# Patient Record
Sex: FEMALE | Race: WHITE | NOT HISPANIC OR LATINO | Employment: FULL TIME | URBAN - NONMETROPOLITAN AREA
[De-identification: names, ages, dates, MRNs, and addresses within clinical notes are randomized per-mention and may not be internally consistent; named-entity substitution may affect disease eponyms.]

---
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
## 2024-09-23 NOTE — PROGRESS NOTES
Assessment & Plan     (F34.1) Dysthymic disorder  (primary encounter diagnosis)  Comment: Zoloft gave her a tremor feeling in side so she quit taking. Will start Lexapro and Buspar. Denies SI/HI thoughts. No plan to harm herself   Plan: busPIRone (BUSPAR) 5 MG tablet, escitalopram         (LEXAPRO) 10 MG tablet            (F41.1) JOE (generalized anxiety disorder)  Comment: Treat with Lexapro and Buspar. Denies SI/HI thoughts. No plan to harm herself   Plan: busPIRone (BUSPAR) 5 MG tablet, escitalopram         (LEXAPRO) 10 MG tablet            (Z00.00) Healthcare maintenance  Comment: Check labs   Plan: TSH with free T4 reflex, Comprehensive         metabolic panel (BMP + Alb, Alk Phos, ALT, AST,        Total. Bili, TP), CBC with platelets, Lipid         Profile (Chol, Trig, HDL, LDL calc)            (R23.8) Scalp irritation  Comment: Treat with Clotrimazole cream   Plan: clotrimazole (LOTRIMIN) 1 % external cream            (B35.1) Nail fungus  Comment: Treat with Lotrisone cream for 4 weeks   Plan: clotrimazole-betamethasone (LOTRISONE) 1-0.05 %        external cream            (Z76.89) Encounter to establish care  Comment: Reviewed past medical, surgical, and family history. N active medications. Care established   Plan: As above     The longitudinal plan of care for the diagnosis(es)/condition(s) as documented were addressed during this visit. Due to the added complexity in care, I will continue to support Maureen in the subsequent management and with ongoing continuity of care.    Depression Screening Follow Up        9/25/2024     1:38 PM   PHQ   PHQ-9 Total Score 19   Q9: Thoughts of better off dead/self-harm past 2 weeks Nearly every day   F/U: Thoughts of suicide or self-harm Yes   F/U: Self harm-plan Yes   F/U: Self-harm action Yes   F/U: Safety concerns No         9/25/2024     1:38 PM   Last PHQ-9   1.  Little interest or pleasure in doing things 3   2.  Feeling down, depressed, or hopeless 2   3.   Trouble falling or staying asleep, or sleeping too much 0   4.  Feeling tired or having little energy 2   5.  Poor appetite or overeating 3   6.  Feeling bad about yourself 3   7.  Trouble concentrating 3   8.  Moving slowly or restless 0   Q9: Thoughts of better off dead/self-harm past 2 weeks 3   PHQ-9 Total Score 19   In the past two weeks have you had thoughts of suicide or self harm? Yes   Do you have concerns about your personal safety or the safety of others? No   In the past 2 weeks have you thought about a plan or had intention to harm yourself? Yes   In the past 2 weeks have you acted on these thoughts in any way? Yes     Denies SI/HI. No plan to harm herself.     Declined counseling. She's done counseling in the past, but didn't feel it was beneficial.     Declined any further resources.     Follow Up Actions Taken  Crisis resource information provided in the After Visit Summary  Patient declined referral.  Started on an antidepressant and antianxiety medication today     Discussed the following ways the patient can remain in a safe environment:  remove things I could use to hurt myself: yes and be around others    See Patient Instructions    Return in about 6 weeks (around 11/6/2024) for Med check .    Yue Reddy is a 19 year old, presenting for the following health issues:  Establish Care, Nail Problem, and Hair/Scalp Problem        9/25/2024     1:43 PM   Additional Questions   Roomed by Komal   Accompanied by self     History of Present Illness       Reason for visit:  Nail fungus  Symptom onset:  More than a month  Symptoms include:  Nail fungus  Symptom intensity:  Moderate  Symptom progression:  Worsening  Had these symptoms before:  No  What makes it worse:  No  What makes it better:  No   She is taking medications regularly.       New Patient/Transfer of Care      Health Care Directive  Patient does not have a Health Care Directive or Living Will: Discussed advance care planning with  "patient; however, patient declined at this time.    She is working at [employer] as a .     She is single. No children.     Derm problem     Duration: 1 year   Description (location/character/radiation): cyst right side of head    Intensity:  moderate  Accompanying signs and symptoms: none   History (similar episodes/previous evaluation): None  Precipitating or alleviating factors: None  Therapies tried and outcome: None     Toenail fungus for at least the past year.     Review of Systems  Constitutional, HEENT, cardiovascular, pulmonary, GI, , musculoskeletal, neuro, skin, endocrine and psych systems are negative, except as otherwise noted.      Objective    BP 96/52   Pulse 63   Temp 98.1  F (36.7  C) (Tympanic)   Ht 1.626 m (5' 4\")   Wt 58.1 kg (128 lb)   SpO2 98%   BMI 21.97 kg/m    Body mass index is 21.97 kg/m .  Physical Exam   GENERAL: alert and no distress  HENT: ear canals and TM's normal, nose and mouth without ulcers or lesions  NECK: no adenopathy, no asymmetry, masses, or scars  RESP: lungs clear to auscultation - no rales, rhonchi or wheezes  CV: regular rate and rhythm, normal S1 S2, no S3 or S4, no murmur, click or rub, no peripheral edema  MS: no gross musculoskeletal defects noted, no edema  SKIN: no suspicious lesions or rashes. +toe nails with toenail fungus. +right side of mid scalp with a scabbed lesion (picking characteristic) without signs of infection   NEURO: Normal strength and tone, mentation intact and speech normal  PSYCH: mentation appears normal, affect normal/bright        Signed Electronically by: GUNNER Pemberton CNP    "

## 2024-09-25 ENCOUNTER — OFFICE VISIT (OUTPATIENT)
Dept: FAMILY MEDICINE | Facility: OTHER | Age: 19
End: 2024-09-25
Attending: NURSE PRACTITIONER
Payer: COMMERCIAL

## 2024-09-25 VITALS
OXYGEN SATURATION: 98 % | TEMPERATURE: 98.1 F | SYSTOLIC BLOOD PRESSURE: 96 MMHG | HEART RATE: 63 BPM | HEIGHT: 64 IN | WEIGHT: 128 LBS | DIASTOLIC BLOOD PRESSURE: 52 MMHG | BODY MASS INDEX: 21.85 KG/M2

## 2024-09-25 DIAGNOSIS — Z76.89 ENCOUNTER TO ESTABLISH CARE: ICD-10-CM

## 2024-09-25 DIAGNOSIS — F41.1 GAD (GENERALIZED ANXIETY DISORDER): ICD-10-CM

## 2024-09-25 DIAGNOSIS — B35.1 NAIL FUNGUS: ICD-10-CM

## 2024-09-25 DIAGNOSIS — F34.1 DYSTHYMIC DISORDER: Primary | ICD-10-CM

## 2024-09-25 DIAGNOSIS — R23.8 SCALP IRRITATION: ICD-10-CM

## 2024-09-25 DIAGNOSIS — Z00.00 HEALTHCARE MAINTENANCE: ICD-10-CM

## 2024-09-25 LAB
ALBUMIN SERPL BCG-MCNC: 4.4 G/DL (ref 3.5–5.2)
ALP SERPL-CCNC: 46 U/L (ref 40–150)
ALT SERPL W P-5'-P-CCNC: 19 U/L (ref 0–50)
ANION GAP SERPL CALCULATED.3IONS-SCNC: 13 MMOL/L (ref 7–15)
AST SERPL W P-5'-P-CCNC: 22 U/L (ref 0–45)
BILIRUB SERPL-MCNC: 0.4 MG/DL
BUN SERPL-MCNC: 12.5 MG/DL (ref 6–20)
CALCIUM SERPL-MCNC: 9.5 MG/DL (ref 8.8–10.4)
CHLORIDE SERPL-SCNC: 103 MMOL/L (ref 98–107)
CHOLEST SERPL-MCNC: 112 MG/DL
CREAT SERPL-MCNC: 0.95 MG/DL (ref 0.51–0.95)
EGFRCR SERPLBLD CKD-EPI 2021: 87 ML/MIN/1.73M2
ERYTHROCYTE [DISTWIDTH] IN BLOOD BY AUTOMATED COUNT: 12.9 % (ref 10–15)
FASTING STATUS PATIENT QL REPORTED: NO
FASTING STATUS PATIENT QL REPORTED: NO
GLUCOSE SERPL-MCNC: 78 MG/DL (ref 70–99)
HCO3 SERPL-SCNC: 23 MMOL/L (ref 22–29)
HCT VFR BLD AUTO: 41.3 % (ref 35–47)
HDLC SERPL-MCNC: 61 MG/DL
HGB BLD-MCNC: 13.7 G/DL (ref 11.7–15.7)
LDLC SERPL CALC-MCNC: 41 MG/DL
MCH RBC QN AUTO: 28.9 PG (ref 26.5–33)
MCHC RBC AUTO-ENTMCNC: 33.2 G/DL (ref 31.5–36.5)
MCV RBC AUTO: 87 FL (ref 78–100)
NONHDLC SERPL-MCNC: 51 MG/DL
PLATELET # BLD AUTO: 195 10E3/UL (ref 150–450)
POTASSIUM SERPL-SCNC: 4 MMOL/L (ref 3.4–5.3)
PROT SERPL-MCNC: 7 G/DL (ref 6.4–8.3)
RBC # BLD AUTO: 4.74 10E6/UL (ref 3.8–5.2)
SODIUM SERPL-SCNC: 139 MMOL/L (ref 135–145)
TRIGL SERPL-MCNC: 52 MG/DL
TSH SERPL DL<=0.005 MIU/L-ACNC: 0.81 UIU/ML (ref 0.3–4.2)
WBC # BLD AUTO: 6.1 10E3/UL (ref 4–11)

## 2024-09-25 PROCEDURE — 36415 COLL VENOUS BLD VENIPUNCTURE: CPT | Performed by: NURSE PRACTITIONER

## 2024-09-25 PROCEDURE — 80061 LIPID PANEL: CPT | Performed by: NURSE PRACTITIONER

## 2024-09-25 PROCEDURE — 80053 COMPREHEN METABOLIC PANEL: CPT | Performed by: NURSE PRACTITIONER

## 2024-09-25 PROCEDURE — 84443 ASSAY THYROID STIM HORMONE: CPT | Performed by: NURSE PRACTITIONER

## 2024-09-25 PROCEDURE — 99215 OFFICE O/P EST HI 40 MIN: CPT | Performed by: NURSE PRACTITIONER

## 2024-09-25 PROCEDURE — 85027 COMPLETE CBC AUTOMATED: CPT | Performed by: NURSE PRACTITIONER

## 2024-09-25 PROCEDURE — G2211 COMPLEX E/M VISIT ADD ON: HCPCS | Performed by: NURSE PRACTITIONER

## 2024-09-25 RX ORDER — ESCITALOPRAM OXALATE 10 MG/1
10 TABLET ORAL DAILY
Qty: 30 TABLET | Refills: 1 | Status: SHIPPED | OUTPATIENT
Start: 2024-09-25 | End: 2024-10-25

## 2024-09-25 RX ORDER — CLOTRIMAZOLE AND BETAMETHASONE DIPROPIONATE 10; .64 MG/G; MG/G
CREAM TOPICAL 2 TIMES DAILY
Qty: 90 G | Refills: 3 | Status: SHIPPED | OUTPATIENT
Start: 2024-09-25

## 2024-09-25 RX ORDER — BUSPIRONE HYDROCHLORIDE 5 MG/1
5 TABLET ORAL 3 TIMES DAILY PRN
Qty: 30 TABLET | Refills: 1 | Status: SHIPPED | OUTPATIENT
Start: 2024-09-25

## 2024-09-25 RX ORDER — CLOTRIMAZOLE 1 %
CREAM (GRAM) TOPICAL 2 TIMES DAILY
Qty: 60 G | Refills: 0 | Status: SHIPPED | OUTPATIENT
Start: 2024-09-25 | End: 2024-09-26

## 2024-09-25 ASSESSMENT — PATIENT HEALTH QUESTIONNAIRE - PHQ9
SUM OF ALL RESPONSES TO PHQ QUESTIONS 1-9: 19
SUM OF ALL RESPONSES TO PHQ QUESTIONS 1-9: 19
10. IF YOU CHECKED OFF ANY PROBLEMS, HOW DIFFICULT HAVE THESE PROBLEMS MADE IT FOR YOU TO DO YOUR WORK, TAKE CARE OF THINGS AT HOME, OR GET ALONG WITH OTHER PEOPLE: SOMEWHAT DIFFICULT

## 2024-09-25 ASSESSMENT — PAIN SCALES - GENERAL: PAINLEVEL: NO PAIN (0)

## 2024-09-26 DIAGNOSIS — R23.8 SCALP IRRITATION: ICD-10-CM

## 2024-09-26 RX ORDER — CLOTRIMAZOLE 1 %
CREAM (GRAM) TOPICAL 2 TIMES DAILY
Qty: 60 G | Refills: 0 | Status: SHIPPED | OUTPATIENT
Start: 2024-09-26

## 2024-11-06 ENCOUNTER — OFFICE VISIT (OUTPATIENT)
Dept: FAMILY MEDICINE | Facility: OTHER | Age: 19
End: 2024-11-06
Attending: NURSE PRACTITIONER
Payer: COMMERCIAL

## 2024-11-06 VITALS
HEART RATE: 77 BPM | TEMPERATURE: 98.3 F | WEIGHT: 136.3 LBS | HEIGHT: 64 IN | DIASTOLIC BLOOD PRESSURE: 56 MMHG | OXYGEN SATURATION: 98 % | BODY MASS INDEX: 23.27 KG/M2 | SYSTOLIC BLOOD PRESSURE: 102 MMHG

## 2024-11-06 DIAGNOSIS — F34.1 DYSTHYMIC DISORDER: ICD-10-CM

## 2024-11-06 DIAGNOSIS — F41.1 GAD (GENERALIZED ANXIETY DISORDER): ICD-10-CM

## 2024-11-06 DIAGNOSIS — Z13.9 SCREENING FOR CONDITION: ICD-10-CM

## 2024-11-06 DIAGNOSIS — B35.1 NAIL FUNGUS: Primary | ICD-10-CM

## 2024-11-06 PROCEDURE — G2211 COMPLEX E/M VISIT ADD ON: HCPCS | Performed by: NURSE PRACTITIONER

## 2024-11-06 PROCEDURE — 99214 OFFICE O/P EST MOD 30 MIN: CPT | Performed by: NURSE PRACTITIONER

## 2024-11-06 RX ORDER — TERBINAFINE HYDROCHLORIDE 250 MG/1
250 TABLET ORAL DAILY
Qty: 90 TABLET | Refills: 0 | Status: SHIPPED | OUTPATIENT
Start: 2024-11-06 | End: 2025-02-04

## 2024-11-06 RX ORDER — ESCITALOPRAM OXALATE 20 MG/1
20 TABLET ORAL DAILY
Qty: 90 TABLET | Refills: 0 | Status: SHIPPED | OUTPATIENT
Start: 2024-11-06 | End: 2025-02-04

## 2024-11-06 ASSESSMENT — PAIN SCALES - GENERAL: PAINLEVEL_OUTOF10: NO PAIN (0)

## 2024-11-06 NOTE — PROGRESS NOTES
Assessment & Plan       (B35.1) Nail fungus  (primary encounter diagnosis)  Comment: 6 weeks of consistent lotrimin cream with mild improvement. Treat with Lamisil   Plan: terbinafine (LAMISIL) 250 MG tablet            (F34.1) Dysthymic disorder  Comment: Increase Lexapro to 20 mg daily. Ref to [facility] in [place]. She would like a DA   Plan: escitalopram (LEXAPRO) 20 MG tablet, Adult         Mental Health  Referral            (F41.1) JOE (generalized anxiety disorder)  Comment: Increase Lexapro to 20 mg daily. Ref to [facility] in [place]. She would like a DA   Plan: escitalopram (LEXAPRO) 20 MG tablet, Adult         Mental Health  Referral            (Z13.9) Screening for condition  Comment: Ref to [facility] in [place]. She would like a DA   Plan: Adult Mental Health  Referral      The longitudinal plan of care for the diagnosis(es)/condition(s) as documented were addressed during this visit. Due to the added complexity in care, I will continue to support Maureen in the subsequent management and with ongoing continuity of care.        See Patient Instructions    Return in about 6 weeks (around 12/18/2024) for Med check .    Yue Reddy is a 19 year old, presenting for the following health issues:  Nail Problem        11/6/2024     8:18 AM   Additional Questions   Roomed by Liseth MEDELLIN     History of Present Illness       Reason for visit:  Followup    She eats 4 or more servings of fruits and vegetables daily.She consumes 0 sweetened beverage(s) daily.She exercises with enough effort to increase her heart rate 20 to 29 minutes per day.  She exercises with enough effort to increase her heart rate 3 or less days per week. She is missing 3 dose(s) of medications per week.  She is not taking prescribed medications regularly due to remembering to take.       Follow up Nail Fungus  Description: nails are still yellow. Improved some       Therapies  "tried and outcome: lotrisone    Feels that Lexapro has helped much. Denies SI/HI thoughts. Tolerating Lexapro well.     Review of Systems  Constitutional, neuro, ENT, endocrine, pulmonary, cardiac, gastrointestinal, genitourinary, musculoskeletal, integument and psychiatric systems are negative, except as otherwise noted.      Objective    /56 (BP Location: Right arm, Patient Position: Sitting, Cuff Size: Adult Regular)   Pulse 77   Temp 98.3  F (36.8  C) (Tympanic)   Ht 1.626 m (5' 4\")   Wt 61.8 kg (136 lb 4.8 oz)   LMP 10/27/2024 (Within Days)   SpO2 98%   BMI 23.40 kg/m    Body mass index is 23.4 kg/m .  Physical Exam   GENERAL: alert and no distress  RESP: lungs clear to auscultation - no rales, rhonchi or wheezes  CV: regular rate and rhythm, normal S1 S2, no S3 or S4, no murmur, click or rub, no peripheral edema  MS: no gross musculoskeletal defects noted, no edema  SKIN: no suspicious lesions or rashes. +toe nails with yellow discoloration (nail fungus) with mild improvement since last visit   PSYCH: mentation appears normal, affect normal/bright      Signed Electronically by: GUNNER Pemberton CNP    "

## 2024-12-09 NOTE — PROGRESS NOTES
Assessment & Plan       (L01.00) Impetigo  (primary encounter diagnosis)  Comment: Treat with Bactroban cream   Plan: mupirocin (BACTROBAN) 2 % external cream            (B35.1) Nail fungus  Comment: Improving. Finish Lamisil   Plan: As above       (F34.1) Dysthymic disorder  Comment: Improving. Feels like Lexapro is helping. She's went to [facility] and is waiting her hear back   Plan: Continue medication regimen     (F41.1) JOE (generalized anxiety disorder)  Comment: Improving. Feels like Lexapro is helping. She's went to [facility] and is waiting her hear back   Plan: Continue medication regimen     The longitudinal plan of care for the diagnosis(es)/condition(s) as documented were addressed during this visit. Due to the added complexity in care, I will continue to support Maureen in the subsequent management and with ongoing continuity of care.    Depression Screening Follow Up        12/11/2024     8:02 AM   PHQ   PHQ-9 Total Score 10    Q9: Thoughts of better off dead/self-harm past 2 weeks Several days    F/U: Thoughts of suicide or self-harm No    F/U: Safety concerns No        Patient-reported         12/11/2024     8:02 AM   Last PHQ-9   1.  Little interest or pleasure in doing things 1    2.  Feeling down, depressed, or hopeless 2    3.  Trouble falling or staying asleep, or sleeping too much 1    4.  Feeling tired or having little energy 0    5.  Poor appetite or overeating 1    6.  Feeling bad about yourself 2    7.  Trouble concentrating 2    8.  Moving slowly or restless 0    Q9: Thoughts of better off dead/self-harm past 2 weeks 1    PHQ-9 Total Score 10    In the past two weeks have you had thoughts of suicide or self harm? No    Do you have concerns about your personal safety or the safety of others? No        Patient-reported     Denies SI/HI thoughts      Follow Up Actions Taken  Crisis resource information provided in the After Visit Summary    Discussed the following ways  the patient can remain in a safe environment:  remove things I could use to hurt myself: yes and be around others    See Patient Instructions    Return in 3 months (on 3/11/2025) for Med check follow up .    Yue Reddy is a 19 year old, presenting for the following health issues:  No chief complaint on file.    History of Present Illness       Reason for visit:  Follow up    She eats 2-3 servings of fruits and vegetables daily.She consumes 1 sweetened beverage(s) daily.She exercises with enough effort to increase her heart rate 20 to 29 minutes per day.  She exercises with enough effort to increase her heart rate 5 days per week. She is missing 1 dose(s) of medications per week.  She is not taking prescribed medications regularly due to remembering to take.       Medication Followup of daniel  Taking Medication as prescribed: yes  Side Effects:  None  Medication Helping Symptoms:  not really sure    Nail Fungus  Description: yellow nails  Progression of Symptoms:  not sure   Therapies tried and outcome: sergisal    Depression and Anxiety   How are you doing with your depression since your last visit? Improved   How are you doing with your anxiety since your last visit?  Worsened   Are you having other symptoms that might be associated with depression or anxiety? No  Have you had a significant life event? No   Do you have any concerns with your use of alcohol or other drugs? No    Social History     Tobacco Use    Smoking status: Never    Smokeless tobacco: Never   Vaping Use    Vaping status: Never Used   Substance Use Topics    Alcohol use: Not Currently    Drug use: Never         8/17/2021     1:00 PM 9/25/2024     1:38 PM 12/11/2024     8:02 AM   PHQ   PHQ-9 Total Score 14 19 10    Q9: Thoughts of better off dead/self-harm past 2 weeks More than half the days Nearly every day  Several days    F/U: Thoughts of suicide or self-harm  Yes  No    F/U: Self harm-plan  Yes     F/U: Self-harm action  Yes    "  F/U: Safety concerns  No  No        Patient-reported         7/27/2021    11:00 AM 8/17/2021     1:00 PM   JOE-7 SCORE   Total Score 12 13     Denies SI/HI thoughts.       Follow Up Actions Taken  Crisis resource information provided in the After Visit Summary     Discussed the following ways the patient can remain in a safe environment:  remove things I could use to hurt myself: yes and be around others  Suicide Assessment Five-step Evaluation and Treatment (SAFE-T)        Review of Systems  Constitutional, neuro, ENT, endocrine, pulmonary, cardiac, gastrointestinal, genitourinary, musculoskeletal, integument and psychiatric systems are negative, except as otherwise noted.      Objective    BP 94/62 (BP Location: Right arm, Patient Position: Sitting, Cuff Size: Adult Regular)   Pulse 97   Temp 97.9  F (36.6  C) (Tympanic)   Ht 1.6 m (5' 3\")   Wt 59.5 kg (131 lb 3.2 oz)   LMP 11/30/2024 (Within Days)   SpO2 96%   BMI 23.24 kg/m    Body mass index is 23.24 kg/m .  Physical Exam   GENERAL: alert and no distress  RESP: lungs clear to auscultation - no rales, rhonchi or wheezes  CV: regular rate and rhythm, normal S1 S2, no S3 or S4, no murmur, click or rub, no peripheral edema  MS: no gross musculoskeletal defects noted, no edema  SKIN: no suspicious lesions or rashes. +center of chin with erythema with yellow drainage (impetigo appearing)  NEURO: Normal strength and tone, mentation intact and speech normal  PSYCH: mentation appears normal, affect normal/bright        Signed Electronically by: GUNNER Pemberton CNP    "

## 2024-12-11 ENCOUNTER — OFFICE VISIT (OUTPATIENT)
Dept: FAMILY MEDICINE | Facility: OTHER | Age: 19
End: 2024-12-11
Attending: NURSE PRACTITIONER
Payer: COMMERCIAL

## 2024-12-11 VITALS
WEIGHT: 131.2 LBS | SYSTOLIC BLOOD PRESSURE: 94 MMHG | HEIGHT: 63 IN | HEART RATE: 97 BPM | OXYGEN SATURATION: 96 % | TEMPERATURE: 97.9 F | BODY MASS INDEX: 23.25 KG/M2 | DIASTOLIC BLOOD PRESSURE: 62 MMHG

## 2024-12-11 DIAGNOSIS — B35.1 NAIL FUNGUS: ICD-10-CM

## 2024-12-11 DIAGNOSIS — L01.00 IMPETIGO: Primary | ICD-10-CM

## 2024-12-11 DIAGNOSIS — F41.1 GAD (GENERALIZED ANXIETY DISORDER): ICD-10-CM

## 2024-12-11 DIAGNOSIS — F34.1 DYSTHYMIC DISORDER: ICD-10-CM

## 2024-12-11 RX ORDER — MUPIROCIN CALCIUM 20 MG/G
CREAM TOPICAL 3 TIMES DAILY
Qty: 30 G | Refills: 0 | Status: SHIPPED | OUTPATIENT
Start: 2024-12-11

## 2024-12-11 ASSESSMENT — PAIN SCALES - GENERAL: PAINLEVEL_OUTOF10: NO PAIN (0)

## 2024-12-11 ASSESSMENT — PATIENT HEALTH QUESTIONNAIRE - PHQ9
SUM OF ALL RESPONSES TO PHQ QUESTIONS 1-9: 10
10. IF YOU CHECKED OFF ANY PROBLEMS, HOW DIFFICULT HAVE THESE PROBLEMS MADE IT FOR YOU TO DO YOUR WORK, TAKE CARE OF THINGS AT HOME, OR GET ALONG WITH OTHER PEOPLE: NOT DIFFICULT AT ALL
SUM OF ALL RESPONSES TO PHQ QUESTIONS 1-9: 10

## 2024-12-11 NOTE — LETTER
My Depression Action Plan  Name: Maureen Gordon   Date of Birth 2005  Date: 12/11/2024    My doctor: Kenia Mcintosh   My clinic: [address] [place] [facility] [address]  [phone]            GREEN    ZONE   Good Control    What it looks like:   Things are going generally well. You have normal ups and downs. You may even feel depressed from time to time, but bad moods usually last less than a day.   What you need to do:  Continue to care for yourself (see self care plan)  Check your depression survival kit and update it as needed  Follow your physician s recommendations including any medication.  Do not stop taking medication unless you consult with your physician first.             YELLOW         ZONE Getting Worse    What it looks like:   Depression is starting to interfere with your life.   It may be hard to get out of bed; you may be starting to isolate yourself from others.  Symptoms of depression are starting to last most all day and this has happened for several days.   You may have suicidal thoughts but they are not constant.   What you need to do:     Call your care team. Your response to treatment will improve if you keep your care team informed of your progress. Yellow periods are signs an adjustment may need to be made.     Continue your self-care.  Just get dressed and ready for the day.  Don't give yourself time to talk yourself out of it.    Talk to someone in your support network.    Open up your Depression Self-Care Plan/Wellness Kit.             RED    ZONE Medical Alert - Get Help    What it looks like:   Depression is seriously interfering with your life.   You may experience these or other symptoms: You can t get out of bed most days, can t work or engage in other necessary activities, you have trouble taking care of basic hygiene, or basic responsibilities, thoughts of suicide or death that will not go away, self-injurious behavior.     What  you need to do:  Call your care team and request a same-day appointment. If they are not available (weekends or after hours) call your local crisis line, emergency room or 911.          Depression Self-Care Plan / Wellness Kit    Many people find that medication and therapy are helpful treatments for managing depression. In addition, making small changes to your everyday life can help to boost your mood and improve your wellbeing. Below are some tips for you to consider. Be sure to talk with your medical provider and/or behavioral health consultant if your symptoms are worsening or not improving.     Sleep   Sleep hygiene  means all of the habits that support good, restful sleep. It includes maintaining a consistent bedtime and wake time, using your bedroom only for sleeping or sex, and keeping the bedroom dark and free of distractions like a computer, smartphone, or television.     Develop a Healthy Routine  Maintain good hygiene. Get out of bed in the morning, make your bed, brush your teeth, take a shower, and get dressed. Don t spend too much time viewing media that makes you feel stressed. Find time to relax each day.    Exercise  Get some form of exercise every day. This will help reduce pain and release endorphins, the  feel good  chemicals in your brain. It can be as simple as just going for a walk or doing some gardening, anything that will get you moving.      Diet  Strive to eat healthy foods, including fruits and vegetables. Drink plenty of water. Avoid excessive sugar, caffeine, alcohol, and other mood-altering substances.     Stay Connected with Others  Stay in touch with friends and family members.    Manage Your Mood  Try deep breathing, massage therapy, biofeedback, or meditation. Take part in fun activities when you can. Try to find something to smile about each day.     Psychotherapy  Be open to working with a therapist if your provider recommends it.     Medication  Be sure to take your  medication as prescribed. Most anti-depressants need to be taken every day. It usually takes several weeks for medications to work. Not all medicines work for all people. It is important to follow-up with your provider to make sure you have a treatment plan that is working for you. Do not stop your medication abruptly without first discussing it with your provider.    Crisis Resources   These hotlines are for both adults and children. They and are open 24 hours a day, 7 days a week unless noted otherwise.    National Suicide Prevention Lifeline   988 or 8-580-361-LOQB (3718)    Crisis Text Line    www.crisistextline.org  Text HOME to 631960 from anywhere in the [place], anytime, about any type of crisis. A live, trained crisis counselor will receive the text and respond quickly.    [place] Lifeline for LGBTQ Youth  A national crisis intervention and suicide lifeline for LGBTQ youth under 25. Provides a safe place to talk without judgement. Call 1-652.678.8299; text START to 839228 or visit www.the[employer]ash[employer].covorproject.org to talk to a trained counselor.    For [facility] crisis numbers, visit the [facility] website at:  https://[place].gov/dhs/people-we-serve/adults/health-care/mental-health/resources/crisis-contacts.jsp

## 2024-12-13 ENCOUNTER — TELEPHONE (OUTPATIENT)
Dept: FAMILY MEDICINE | Facility: OTHER | Age: 19
End: 2024-12-13

## 2024-12-13 NOTE — TELEPHONE ENCOUNTER
[facility] Pharm[facility] Prior Authorization Team   Phone: [phone]    PRIOR AUTHORIZATION DENIED    DOCUMENTING ON BEHALF OF [facility]ISH [facility]    Medication: MUPIROCIN CALCIUM 2 % EX CREA  Insurance Company: [employer] - Phone [phone] Fax [phone]  Denial Date: 12/13/2024  Denial Reason(s): MUST TRY/FAIL MUPIROCIN OINTMENT      Appeal Information: IF THE PROVIDER WOULD LIKE TO APPEAL THIS DECISION PLEASE PROVIDE THE PA TEAM WITH A LETTER OF MEDICAL NECESSITY      Patient Notified: NO

## 2024-12-16 ENCOUNTER — MYC REFILL (OUTPATIENT)
Dept: FAMILY MEDICINE | Facility: OTHER | Age: 19
End: 2024-12-16

## 2024-12-16 DIAGNOSIS — B35.1 NAIL FUNGUS: ICD-10-CM

## 2024-12-16 DIAGNOSIS — F41.1 GAD (GENERALIZED ANXIETY DISORDER): ICD-10-CM

## 2024-12-16 DIAGNOSIS — F34.1 DYSTHYMIC DISORDER: ICD-10-CM

## 2024-12-17 RX ORDER — TERBINAFINE HYDROCHLORIDE 250 MG/1
250 TABLET ORAL DAILY
Qty: 90 TABLET | Refills: 0 | Status: SHIPPED | OUTPATIENT
Start: 2024-12-17

## 2024-12-17 RX ORDER — ESCITALOPRAM OXALATE 20 MG/1
20 TABLET ORAL DAILY
Qty: 90 TABLET | Refills: 0 | Status: SHIPPED | OUTPATIENT
Start: 2024-12-17

## 2024-12-17 NOTE — TELEPHONE ENCOUNTER
Lamisil      Last Written Prescription Date:  11/6/24  Last Fill Quantity: 90,   # refills: 0  Last Office Visit: 12/11/24  Future Office visit:       Routing refill request to provider for review/approval because:

## 2024-12-17 NOTE — TELEPHONE ENCOUNTER
Lexapro      Last Written Prescription Date:  11/6/24  Last Fill Quantity: 90,   # refills: 0  Last Office Visit: 12/11/24  Future Office visit:       Routing refill request to provider for review/approval because:

## 2025-01-11 ENCOUNTER — HEALTH MAINTENANCE LETTER (OUTPATIENT)
Age: 20
End: 2025-01-11

## 2025-03-10 NOTE — PROGRESS NOTES
"  Assessment & Plan       (B35.1) Nail fungus  (primary encounter diagnosis)  Comment: 6 weeks of Lamisil with minimal improvement . Ref to podiatry   Plan: Orthopedic  Referral            (F34.1) Dysthymic disorder  Comment: Improving symptoms. Lexapro makes her tired when she takes it before bed. She wakes up in the morning and feels she can get on with her day. Discussed therapy and she isn't interested as she felt it wasn't helpful in the past. RF Lexapro   Plan: escitalopram (LEXAPRO) 20 MG tablet            (F41.1) JOE (generalized anxiety disorder)  Comment: Improving symptoms. Lexapro makes her tired when she takes it before bed. She wakes up in the morning and feels she can get on with her day. Discussed therapy and she isn't interested as she felt it wasn't helpful in the past. RF Lexapro   Plan: escitalopram (LEXAPRO) 20 MG tablet              BMI  Estimated body mass index is 25.44 kg/m  as calculated from the following:    Height as of this encounter: 1.6 m (5' 3\").    Weight as of this encounter: 65.1 kg (143 lb 9.6 oz).       Depression Screening Follow Up        3/11/2025     8:23 AM   PHQ   PHQ-9 Total Score 11    Q9: Thoughts of better off dead/self-harm past 2 weeks Several days   F/U: Thoughts of suicide or self-harm No   F/U: Safety concerns No       Patient-reported         3/11/2025     8:23 AM   Last PHQ-9   1.  Little interest or pleasure in doing things 1   2.  Feeling down, depressed, or hopeless 1   3.  Trouble falling or staying asleep, or sleeping too much 3   4.  Feeling tired or having little energy 0   5.  Poor appetite or overeating 3   6.  Feeling bad about yourself 1   7.  Trouble concentrating 1   8.  Moving slowly or restless 0   Q9: Thoughts of better off dead/self-harm past 2 weeks 1   PHQ-9 Total Score 11    In the past two weeks have you had thoughts of suicide or self harm? No   Do you have concerns about your personal safety or the safety of others? No       " Patient-reported         Follow Up Actions Taken  Crisis resource information provided in the After Visit Summary  Patient to follow up with PCP.  Clinic staff to schedule appointment if able.  Patient declined referral.    Discussed the following ways the patient can remain in a safe environment:  remove things I could use to hurt myself: yes and be around others    See Patient Instructions    Return in about 3 months (around 6/11/2025) for Med check .    Yue Reddy is a 19 year old, presenting for the following health issues:  Nail Problem, Depression, and Anxiety        3/11/2025     8:36 AM   Additional Questions   Roomed by Jess MEDELLIN     History of Present Illness       Reason for visit:  Med check    She eats 4 or more servings of fruits and vegetables daily.She consumes 0 sweetened beverage(s) daily.She exercises with enough effort to increase her heart rate 9 or less minutes per day.  She exercises with enough effort to increase her heart rate 3 or less days per week. She is missing 6 dose(s) of medications per week.  She is not taking prescribed medications regularly due to remembering to take.        Nail Fungus Follow Up  Onset: brought up 9/25/24   Description:   Yellow nails   Progression of Symptoms:  same- doesn't feel like they are changing at all  Therapies Tried and outcome: lamisal       Depression and Anxiety   How are you doing with your depression since your last visit? Improved   How are you doing with your anxiety since your last visit?  No change  Are you having other symptoms that might be associated with depression or anxiety? No  Have you had a significant life event? No   Do you have any concerns with your use of alcohol or other drugs? No    Social History     Tobacco Use    Smoking status: Never    Smokeless tobacco: Never   Vaping Use    Vaping status: Never Used   Substance Use Topics    Alcohol use: Not Currently    Drug use: Never         9/25/2024     1:38 PM 12/11/2024  "    8:02 AM 3/11/2025     8:23 AM   PHQ   PHQ-9 Total Score 19 10  11    Q9: Thoughts of better off dead/self-harm past 2 weeks Nearly every day Several days Several days   F/U: Thoughts of suicide or self-harm Yes No No   F/U: Self harm-plan Yes     F/U: Self-harm action Yes     F/U: Safety concerns No No No       Patient-reported         7/27/2021    11:00 AM 8/17/2021     1:00 PM 3/11/2025     8:24 AM   JOE-7 SCORE   Total Score   7 (mild anxiety)   Total Score 12 13 7        Patient-reported     Denies SI/HI thoughts.     Follow Up Actions Taken  Crisis resource information provided in the After Visit Summary  Patient to follow up with PCP.  Clinic staff to schedule appointment if able.  Patient declined referral.     Discussed the following ways the patient can remain in a safe environment:  remove things I could use to hurt myself: yes and be around others  Suicide Assessment Five-step Evaluation and Treatment (SAFE-T)      Review of Systems  Constitutional, HEENT, cardiovascular, pulmonary, GI, , musculoskeletal, neuro, skin, endocrine and psych systems are negative, except as otherwise noted.      Objective    Pulse 86   Temp 98.4  F (36.9  C) (Tympanic)   Ht 1.6 m (5' 3\")   Wt 65.1 kg (143 lb 9.6 oz)   SpO2 98%   BMI 25.44 kg/m    Body mass index is 25.44 kg/m .  Physical Exam   GENERAL: alert and no distress  NECK: no adenopathy, no asymmetry, masses, or scars  RESP: lungs clear to auscultation - no rales, rhonchi or wheezes  CV: regular rate and rhythm, normal S1 S2, no S3 or S4, no murmur, click or rub, no peripheral edema  MS: no gross musculoskeletal defects noted, no edema. +Yellow discoloration to toe nails with some thickness to toenails   SKIN: no suspicious lesions or rashes  NEURO: Normal strength and tone, mentation intact and speech normal  PSYCH: mentation appears normal, affect normal/bright        Signed Electronically by: GUNNER Pemberton CNP    "

## 2025-03-11 ENCOUNTER — OFFICE VISIT (OUTPATIENT)
Dept: FAMILY MEDICINE | Facility: OTHER | Age: 20
End: 2025-03-11
Payer: COMMERCIAL

## 2025-03-11 VITALS
TEMPERATURE: 98.4 F | HEIGHT: 63 IN | WEIGHT: 143.6 LBS | OXYGEN SATURATION: 98 % | HEART RATE: 86 BPM | BODY MASS INDEX: 25.45 KG/M2

## 2025-03-11 DIAGNOSIS — F34.1 DYSTHYMIC DISORDER: ICD-10-CM

## 2025-03-11 DIAGNOSIS — F41.1 GAD (GENERALIZED ANXIETY DISORDER): ICD-10-CM

## 2025-03-11 DIAGNOSIS — B35.1 NAIL FUNGUS: Primary | ICD-10-CM

## 2025-03-11 RX ORDER — ESCITALOPRAM OXALATE 20 MG/1
20 TABLET ORAL DAILY
Qty: 90 TABLET | Refills: 1 | Status: SHIPPED | OUTPATIENT
Start: 2025-03-11

## 2025-03-11 ASSESSMENT — PAIN SCALES - GENERAL: PAINLEVEL_OUTOF10: NO PAIN (0)

## 2025-03-11 ASSESSMENT — ANXIETY QUESTIONNAIRES
2. NOT BEING ABLE TO STOP OR CONTROL WORRYING: NOT AT ALL
7. FEELING AFRAID AS IF SOMETHING AWFUL MIGHT HAPPEN: SEVERAL DAYS
IF YOU CHECKED OFF ANY PROBLEMS ON THIS QUESTIONNAIRE, HOW DIFFICULT HAVE THESE PROBLEMS MADE IT FOR YOU TO DO YOUR WORK, TAKE CARE OF THINGS AT HOME, OR GET ALONG WITH OTHER PEOPLE: SOMEWHAT DIFFICULT
8. IF YOU CHECKED OFF ANY PROBLEMS, HOW DIFFICULT HAVE THESE MADE IT FOR YOU TO DO YOUR WORK, TAKE CARE OF THINGS AT HOME, OR GET ALONG WITH OTHER PEOPLE?: SOMEWHAT DIFFICULT
6. BECOMING EASILY ANNOYED OR IRRITABLE: MORE THAN HALF THE DAYS
GAD7 TOTAL SCORE: 7
1. FEELING NERVOUS, ANXIOUS, OR ON EDGE: SEVERAL DAYS
GAD7 TOTAL SCORE: 7
5. BEING SO RESTLESS THAT IT IS HARD TO SIT STILL: SEVERAL DAYS
GAD7 TOTAL SCORE: 7
4. TROUBLE RELAXING: SEVERAL DAYS
3. WORRYING TOO MUCH ABOUT DIFFERENT THINGS: SEVERAL DAYS
7. FEELING AFRAID AS IF SOMETHING AWFUL MIGHT HAPPEN: SEVERAL DAYS

## 2025-03-11 ASSESSMENT — PATIENT HEALTH QUESTIONNAIRE - PHQ9
SUM OF ALL RESPONSES TO PHQ QUESTIONS 1-9: 11
10. IF YOU CHECKED OFF ANY PROBLEMS, HOW DIFFICULT HAVE THESE PROBLEMS MADE IT FOR YOU TO DO YOUR WORK, TAKE CARE OF THINGS AT HOME, OR GET ALONG WITH OTHER PEOPLE: NOT DIFFICULT AT ALL
SUM OF ALL RESPONSES TO PHQ QUESTIONS 1-9: 11

## 2025-05-17 ENCOUNTER — MYC REFILL (OUTPATIENT)
Dept: FAMILY MEDICINE | Facility: OTHER | Age: 20
End: 2025-05-17

## 2025-05-17 DIAGNOSIS — F34.1 DYSTHYMIC DISORDER: ICD-10-CM

## 2025-05-17 DIAGNOSIS — F41.1 GAD (GENERALIZED ANXIETY DISORDER): ICD-10-CM

## 2025-05-19 RX ORDER — ESCITALOPRAM OXALATE 20 MG/1
20 TABLET ORAL DAILY
Qty: 90 TABLET | Refills: 1 | Status: SHIPPED | OUTPATIENT
Start: 2025-05-19

## 2025-05-19 NOTE — TELEPHONE ENCOUNTER
SSRIs Protocol [id number]/17/2025 02:48 PM   Protocol Details PHQ-9 score less than 5 in past 6 months    JEO-7 score of less than 5 in past 6 months.         9/25/2024     1:38 PM 12/11/2024     8:02 AM 3/11/2025     8:23 AM   PHQ   PHQ-9 Total Score 19 10  11    Q9: Thoughts of better off dead/self-harm past 2 weeks Nearly every day Several days Several days   F/U: Thoughts of suicide or self-harm Yes No No   F/U: Self harm-plan Yes     F/U: Self-harm action Yes     F/U: Safety concerns No No No       Patient-reported           7/27/2021    11:00 AM 8/17/2021     1:00 PM 3/11/2025     8:24 AM   JOE-7 SCORE   Total Score   7 (mild anxiety)   Total Score 12 13 7        Patient-reported

## 2025-05-19 NOTE — TELEPHONE ENCOUNTER
Lexapro      Last Written Prescription Date:  3/11/25  Last Fill Quantity: 90,   # refills: 1  Last Office Visit: 3/11/25  Future Office visit:       Routing refill request to provider for review/approval because: